# Patient Record
Sex: FEMALE | Race: WHITE
[De-identification: names, ages, dates, MRNs, and addresses within clinical notes are randomized per-mention and may not be internally consistent; named-entity substitution may affect disease eponyms.]

---

## 2020-01-27 ENCOUNTER — HOSPITAL ENCOUNTER (EMERGENCY)
Dept: HOSPITAL 61 - ER | Age: 85
Discharge: HOME | End: 2020-01-27
Payer: MEDICARE

## 2020-01-27 VITALS — HEIGHT: 62 IN | BODY MASS INDEX: 27.59 KG/M2 | WEIGHT: 149.91 LBS

## 2020-01-27 VITALS — DIASTOLIC BLOOD PRESSURE: 62 MMHG | SYSTOLIC BLOOD PRESSURE: 118 MMHG

## 2020-01-27 DIAGNOSIS — M25.522: Primary | ICD-10-CM

## 2020-01-27 DIAGNOSIS — I10: ICD-10-CM
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
## 2020-01-27 DIAGNOSIS — G89.11: ICD-10-CM
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
## 2020-01-27 DIAGNOSIS — Z88.8: ICD-10-CM

## 2020-01-27 DIAGNOSIS — Z91.013: ICD-10-CM

## 2020-01-27 PROCEDURE — 73080 X-RAY EXAM OF ELBOW: CPT

## 2020-01-27 PROCEDURE — 99284 EMERGENCY DEPT VISIT MOD MDM: CPT

## 2020-01-27 NOTE — PHYS DOC
Past Medical History


Past Medical History:  Hypertension, UTI, Other


Additional Past Medical Histor:  PRE DIABETIC, POOR HISTORIAN


Past Surgical History:  Other


Additional Past Surgical Histo:  UNKNOWN YRS AGO


Alcohol Use:  None


Drug Use:  None





Adult General


Chief Complaint


Chief Complaint:  ELBOW PROBLEM





HPI


HPI





Patient is a 86  year old female who presents with fell one month ago out in the

yard when she tripped and fell. States she hurt her left elbow. Patient rates it

an 8 out of 10. She suggested using Aleve and Biofreeze but it still painful. 

Currently rates her pain 7 out of 10.





Review of Systems


Review of Systems








Musculoskeletal: Denies back pain. Left elbow joint pain []








All other systems were reviewed and found to be within normal limits, except as 

documented in this note.





Allergies


Allergies





Allergies








Coded Allergies Type Severity Reaction Last Updated Verified


 


  iodine Allergy Severe  12/31/15 Yes


 


  shellfish derived Allergy Intermediate  12/30/15 Yes











Physical Exam


Physical Exam





Constitutional: Well developed, well nourished, no acute distress, non-toxic 

appearance. []


HENT: Normocephalic, atraumatic, bilateral external ears normal, oropharynx 

moist, no oral exudates, nose normal. []


Eyes: PERRLA, EOMI, conjunctiva normal, no discharge. [] 


Neck: Normal range of motion, no tenderness, supple, no stridor. [] 


Cardiovascular:Heart rate regular rhythm, no murmur []


Lungs & Thorax:  Bilateral breath sounds clear to auscultation []


Abdomen: Bowel sounds normal, soft, no tenderness, no masses, no pulsatile 

masses. [] 


Skin: Warm, dry, no erythema, no rash. [] 


Back: No tenderness, no CVA tenderness. [] 


Extremities: Left lateral elbow tenderness, no cyanosis, no clubbing, ROM 

intact, no edema. [] 


Neurologic: Alert and oriented X 3, normal motor function, normal sensory 

function, no focal deficits noted. []


Psychologic: Affect normal, judgement normal, mood normal. []





Current Patient Data


Vital Signs





                                   Vital Signs








  Date Time  Temp Pulse Resp B/P (MAP) Pulse Ox O2 Delivery O2 Flow Rate FiO2


 


20 21:00 96.3 66 18 118/62 (80) 96 Room Air  





 96.3       











EKG


EKG


[]





Radiology/Procedures


Radiology/Procedures


[]


Impressions:


[facility]


                    [address] [place] [address]


                                 [phone]


                                        


                                 IMAGING REPORT





                                     Signed





PATIENT: GREGG BACON     ACCOUNT: [id number]     MRN#: [id number]


: 1933           LOCATION: ER              AGE: 86


SEX: F                    EXAM DT: 20         ACCESSION#: [id number]


STATUS: REG ER            ORD. PHYSICIAN: GRACE LACEY


REASON: PAIN, INJURY 1 MONTH AGO


PROCEDURE: ELBOW LEFT 3V





EXAM: 3 views of the left elbow


 


DATE: 2020 10:52 PM


 


INDICATION: PAIN, INJURY 1 MONTH AGO


 


COMPARISON: No Prior


 


FINDINGS:


No elbow joint effusion. No acute fracture or dislocation. Mild 


degenerative changes at the ulnohumeral compartment. No significant soft 


tissue swelling.


 


IMPRESSION:


No acute fracture or dislocation. 


Mild degenerative change at the ulnohumeral compartment


 


Electronically signed by: Arnie Tesfaye MD (2020 11:30 PM) [facility] [place]














DICTATED and SIGNED BY:     ARNIE TESFAYE MD


DATE:     20 651








Course & Med Decision Making


Course & Med Decision Making


Alert and oriented. Ambulatory and steady gait. Left elbow is no swelling and no

 deformity and no bruising. Tenderness to lateral elbow. Full range of motion of

 the elbow. Cap refill less than 3 seconds. Radial pulses strong and present. 

Neurologically intact. Patient denies any numbness or tingling, skin color 

changes, and temperature changes. Skin pink warm and dry.





Xray shows: IMPRESSION:


No acute fracture or dislocation. 


Mild degenerative change at the ulnohumeral compartment





Patient to continue taking Aleve and I will provide her with a shoulder sling to

 rest her elbow. Patient follow-up with her primary care provider if needed.





[]





Dragon Disclaimer


Dragon Disclaimer


This electronic medical record was generated, in whole or in part, using a voice

 recognition dictation system.





Departure


Departure


Impression:  


   Primary Impression:  


   Left elbow pain


Disposition:  01 HOME, SELF-CARE


Condition:  STABLE


Referrals:  


ROGELIO VELASQUEZ MD (PCP)


Patient Instructions:  Elbow Contusion





Additional Instructions:  


Follow-up with her primary care provider. You can continue taking Aleve with 

pain medicine I am ordering you. Rest elbow as much as possible.


Scripts


Hydrocodone/Apap 5-325 (NORCO 5-325 TABLET) 1 Each Tablet


1 TAB PO PRN Q6HRS PRN for PAIN, #8 TAB 0 Refills


   Prov: GRACE LACEY         20











GRACE LACEY            2020 23:20

## 2020-01-27 NOTE — RAD
EXAM: 3 views of the left elbow

 

DATE: 1/27/2020 10:52 PM

 

INDICATION: PAIN, INJURY 1 MONTH AGO

 

COMPARISON: No Prior

 

FINDINGS:

No elbow joint effusion. No acute fracture or dislocation. Mild 

degenerative changes at the ulnohumeral compartment. No significant soft 

tissue swelling.

 

IMPRESSION:

No acute fracture or dislocation. 

Mild degenerative change at the ulnohumeral compartment

 

Electronically signed by: Arnie Coleman MD (1/27/2020 11:30 PM) [facility] [place]

## 2020-08-20 ENCOUNTER — HOSPITAL ENCOUNTER (EMERGENCY)
Dept: HOSPITAL 61 - ER | Age: 85
LOS: 1 days | Discharge: HOME | End: 2020-08-21
Payer: COMMERCIAL

## 2020-08-20 VITALS — WEIGHT: 119.05 LBS | BODY MASS INDEX: 21.09 KG/M2 | HEIGHT: 63 IN

## 2020-08-20 DIAGNOSIS — Z98.890: ICD-10-CM
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
## 2020-08-20 DIAGNOSIS — I10: ICD-10-CM

## 2020-08-20 DIAGNOSIS — Z91.013: ICD-10-CM
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
## 2020-08-20 DIAGNOSIS — M25.552: ICD-10-CM

## 2020-08-20 DIAGNOSIS — G89.11: Primary | ICD-10-CM

## 2020-08-20 DIAGNOSIS — Z91.041: ICD-10-CM

## 2020-08-20 DIAGNOSIS — M54.5: ICD-10-CM

## 2020-08-20 PROCEDURE — 99284 EMERGENCY DEPT VISIT MOD MDM: CPT

## 2020-08-20 PROCEDURE — 72131 CT LUMBAR SPINE W/O DYE: CPT

## 2020-08-20 PROCEDURE — 73502 X-RAY EXAM HIP UNI 2-3 VIEWS: CPT

## 2020-08-20 NOTE — RAD
INDICATION: Reason: low back pain after fall from standing / Spl. 

Instructions:  / History: 

 

COMPARISON: December 2015

 

TECHNIQUE:

 

Axial CT images obtained through the lumbar spine.

 

One or more of the following individualized dose reduction techniques were

utilized for this examination:  1. Automated exposure control;  2. 

Adjustment of the mA and/or kV according to patient size;  3. Use of 

iterative reconstruction technique.

 

FINDINGS:

 

Scoliotic curvature of the spine.

Degenerative changes of the spine with osteophyte formation at vertebral 

body endplates as well as disc protrusions. Facet hypertrophy.

Degenerative changes of the hips.

Sclerosis at the right sacroiliac joint which could be degenerative in 

nature or secondary to chronic sacroiliitis.

Severe calcific atherosclerosis.

 

 

IMPRESSION:

 

*  Degenerative changes throughout the lumbar spine with scoliotic 

curvature as well as multilevel central canal and neural foraminal 

stenosis. A definite acute fracture line is not seen.

 

Electronically signed by: Dandre Pisano MD (8/20/2020 11:52 PM) 

[id number]

## 2020-08-20 NOTE — PHYS DOC
Past Medical History


Past Medical History:  Hypertension, UTI, Other


Additional Past Medical Histor:  PRE DIABETIC, POOR HISTORIAN


Past Surgical History:  Other


Additional Past Surgical Histo:  UNKNOWN YRS AGO


Smoking Status:  Never Smoker


Alcohol Use:  None


Drug Use:  None





General Adult


EDM:


Chief Complaint:  MECHANICAL FALL





HPI:


HPI:





Patient is a 87  year old female, accompanied by her daughter, who presents to 

the emergency department with complaints of pain in her left hip and low back 

after a fall this afternoon on her porch.  Patient states she just got done 

mowing her yard when she slipped on her porch and fell onto her butt.  Patient 

denies any head injury or loss of consciousness.  She denies any nausea, 

vomiting, saddle anesthesia, vision changes, numbness, tingling, or loss of 

bowel/bladder control.  She denies any shortness of breath, chest pain, or 

syncope.  She currently rates her pain a 8 out of 10 on the pain scale, movement

and palpation make the pain worse, she tried taking Aleve this afternoon for 

relief of the pain with no improvement.  She denies any radiation of the pain.





Review of Systems:


Review of Systems:


Constitutional:   Denies fever or chills. []


Eyes:   Denies change in visual acuity. []


HENT:   Denies nasal congestion or sore throat. [] 


Respiratory:   Denies cough or shortness of breath. [] 


Cardiovascular:   Denies chest pain or edema. [] 


GI:   Denies abdominal pain, nausea, or vomiting


:  Denies dysuria. [] 


Musculoskeletal: See HPI


Integument:   Denies rash. [] 


Neurologic:   Denies headache, focal weakness or sensory changes. [] 


Psychiatric:  Denies depression or anxiety. []





Heart Score:


Risk Factors:


Risk Factors:  DM, Current or recent (<one month) smoker, HTN, HLP, family 

history of CAD, obesity.


Risk Scores:


Score 0 - 3:  2.5% MACE over next 6 weeks - Discharge Home


Score 4 - 6:  20.3% MACE over next 6 weeks - Admit for Clinical Observation


Score 7 - 10:  72.7% MACE over next 6 weeks - Early Invasive Strategies





Allergies:


Allergies:





Allergies








Coded Allergies Type Severity Reaction Last Updated Verified


 


  iodine Allergy Severe  12/31/15 Yes


 


  shellfish derived Allergy Intermediate  12/30/15 Yes











Physical Exam:


PE:





Constitutional: Well developed, well nourished, no acute distress, non-toxic lakhwinder

earance. []


HENT: Normocephalic, atraumatic, bilateral external ears normal, nose normal. []


Eyes: PERRLA, EOMI, conjunctiva normal, no discharge. [] 


Neck: Normal range of motion, no tenderness, supple, no stridor. [] 


Cardiovascular:Heart rate regular rhythm


Lungs & Thorax:  Bilateral breath sounds clear to auscultation, Respirations 

even and unlabored, no retractions, no respiratory distress []


Abdomen: soft, no tenderness


Skin: Warm, dry, no erythema, no rash. [] 


Back: No cervical or thoracic tenderness to palpation; lower lumbar tenderness 

to palpation without crepitus, step-off, or obvious deformity.  


Extremities: Left hip: Lateral tenderness to palpation without crepitus or 

obvious deformity, no cyanosis, no clubbing, ROM intact, no edema, no shortening

 or rotation.


Neurologic: Alert and oriented X 3, normal motor function, normal sensory 

function, no focal deficits noted. []


Psychologic: Affect normal, judgement normal, mood normal. []





Current Patient Data:


Vital Signs:





                                   Vital Signs








  Date Time  Temp Pulse Resp B/P (MAP) Pulse Ox O2 Delivery O2 Flow Rate FiO2


 


8/20/20 21:49 96.9 77 16 156/81 (106) 100 Room Air  





 96.9       











EKG:


EKG:


[]





Radiology/Procedures:


Radiology/Procedures:


PROCEDURE: CT LUMBAR SPINE WO CONTRAST





INDICATION: Reason: low back pain after fall from standing / Spl. 


Instructions:  / History: 


 


COMPARISON: December 2015


 


TECHNIQUE:


 


Axial CT images obtained through the lumbar spine.


 


One or more of the following individualized dose reduction techniques were


utilized for this examination:  1. Automated exposure control;  2. 


Adjustment of the mA and/or kV according to patient size;  3. Use of 


iterative reconstruction technique.


 


FINDINGS:


 


Scoliotic curvature of the spine.


Degenerative changes of the spine with osteophyte formation at vertebral 


body endplates as well as disc protrusions. Facet hypertrophy.


Degenerative changes of the hips.


Sclerosis at the right sacroiliac joint which could be degenerative in 


nature or secondary to chronic sacroiliitis.


Severe calcific atherosclerosis.


 


 


IMPRESSION:


 


*  Degenerative changes throughout the lumbar spine with scoliotic 


curvature as well as multilevel central canal and neural foraminal 


stenosis. A definite acute fracture line is not seen.[]





Course & Med Decision Making:


Course & Med Decision Making


Pertinent Labs and Imaging studies reviewed. (See chart for details)





[]





Dragon Disclaimer:


Dragon Disclaimer:


This electronic medical record was generated, in whole or in part, using a voice

 recognition dictation system.





Departure


Departure


Impression:  


   Primary Impression:  


   Fall from standing


   Qualified Codes:  W19.XXXA - Unspecified fall, initial encounter


   Additional Impressions:  


   Low back pain


   Qualified Codes:  M54.5 - Low back pain


   Acute pain of left hip


Disposition:  01 HOME, SELF-CARE


Condition:  STABLE


Referrals:  


ROGELIO SALAZAR MD (PCP)


Patient Instructions:  Back Pain, Adult, Easy-to-Read, Fall Prevention and Home 

Safety, Easy-to-Read, Hip Pain





Additional Instructions:  


Apply ice to sore areas for 10 to 15 minutes every hour as needed for pain.  You

 may take Tylenol or ibuprofen as needed for pain.  Activity as tolerated.  

Follow-up with Dr. Salazar next week for reevaluation, return to the ER if 

symptoms worsen.





Justicifation of Admission Dx:


Justifications for Admission:


Justification of Admission Dx:  N/A











JONATHAN POLANCO APRN       Aug 20, 2020 22:04

## 2020-08-21 VITALS — SYSTOLIC BLOOD PRESSURE: 137 MMHG | DIASTOLIC BLOOD PRESSURE: 69 MMHG

## 2020-08-21 NOTE — RAD
Pelvis and left Two View hip:

 

 

Clinical History:  Low back pain and left hip pain after fall from 

standing.

 

Technique:  AP view the pelvis AP and frog leg views of the left hip were 

obtained.

 

Comparison: None.

 

Findings:

 

There is obscuration of bony detail of sacrum due to overlying bowel gas. 

The visualized osseous structures appear normal.  The femoral acetabular 

relationship is normal. There is gross osteopenia which decreases 

sensitivity for a possible nondisplaced fracture.

 

 

Impression:  

 

No acute findings.

 

Electronically signed by: Adama Celis III, MD (8/21/2020 8:08 AM) [id number]